# Patient Record
Sex: MALE | Race: WHITE | NOT HISPANIC OR LATINO | URBAN - METROPOLITAN AREA
[De-identification: names, ages, dates, MRNs, and addresses within clinical notes are randomized per-mention and may not be internally consistent; named-entity substitution may affect disease eponyms.]

---

## 2019-10-07 ENCOUNTER — EMERGENCY (EMERGENCY)
Facility: HOSPITAL | Age: 44
LOS: 0 days | Discharge: HOME | End: 2019-10-07
Attending: EMERGENCY MEDICINE | Admitting: EMERGENCY MEDICINE
Payer: MEDICAID

## 2019-10-07 VITALS
WEIGHT: 179.9 LBS | SYSTOLIC BLOOD PRESSURE: 163 MMHG | OXYGEN SATURATION: 99 % | TEMPERATURE: 97 F | HEART RATE: 71 BPM | DIASTOLIC BLOOD PRESSURE: 94 MMHG | RESPIRATION RATE: 18 BRPM

## 2019-10-07 DIAGNOSIS — R05 COUGH: ICD-10-CM

## 2019-10-07 DIAGNOSIS — J32.9 CHRONIC SINUSITIS, UNSPECIFIED: ICD-10-CM

## 2019-10-07 DIAGNOSIS — R07.89 OTHER CHEST PAIN: ICD-10-CM

## 2019-10-07 DIAGNOSIS — F17.210 NICOTINE DEPENDENCE, CIGARETTES, UNCOMPLICATED: ICD-10-CM

## 2019-10-07 DIAGNOSIS — R51 HEADACHE: ICD-10-CM

## 2019-10-07 LAB
ALBUMIN SERPL ELPH-MCNC: 4.3 G/DL — SIGNIFICANT CHANGE UP (ref 3.5–5.2)
ALP SERPL-CCNC: 75 U/L — SIGNIFICANT CHANGE UP (ref 30–115)
ALT FLD-CCNC: 21 U/L — SIGNIFICANT CHANGE UP (ref 0–41)
ANION GAP SERPL CALC-SCNC: 15 MMOL/L — HIGH (ref 7–14)
AST SERPL-CCNC: 14 U/L — SIGNIFICANT CHANGE UP (ref 0–41)
BILIRUB SERPL-MCNC: 0.5 MG/DL — SIGNIFICANT CHANGE UP (ref 0.2–1.2)
BUN SERPL-MCNC: 11 MG/DL — SIGNIFICANT CHANGE UP (ref 10–20)
CALCIUM SERPL-MCNC: 9.2 MG/DL — SIGNIFICANT CHANGE UP (ref 8.5–10.1)
CHLORIDE SERPL-SCNC: 101 MMOL/L — SIGNIFICANT CHANGE UP (ref 98–110)
CO2 SERPL-SCNC: 24 MMOL/L — SIGNIFICANT CHANGE UP (ref 17–32)
CREAT SERPL-MCNC: 0.8 MG/DL — SIGNIFICANT CHANGE UP (ref 0.7–1.5)
GLUCOSE SERPL-MCNC: 110 MG/DL — HIGH (ref 70–99)
HCT VFR BLD CALC: 43.2 % — SIGNIFICANT CHANGE UP (ref 42–52)
HGB BLD-MCNC: 14.6 G/DL — SIGNIFICANT CHANGE UP (ref 14–18)
MCHC RBC-ENTMCNC: 31.1 PG — HIGH (ref 27–31)
MCHC RBC-ENTMCNC: 33.8 G/DL — SIGNIFICANT CHANGE UP (ref 32–37)
MCV RBC AUTO: 91.9 FL — SIGNIFICANT CHANGE UP (ref 80–94)
NRBC # BLD: 0 /100 WBCS — SIGNIFICANT CHANGE UP (ref 0–0)
PLATELET # BLD AUTO: 197 K/UL — SIGNIFICANT CHANGE UP (ref 130–400)
POTASSIUM SERPL-MCNC: 3.8 MMOL/L — SIGNIFICANT CHANGE UP (ref 3.5–5)
POTASSIUM SERPL-SCNC: 3.8 MMOL/L — SIGNIFICANT CHANGE UP (ref 3.5–5)
PROT SERPL-MCNC: 7.2 G/DL — SIGNIFICANT CHANGE UP (ref 6–8)
RBC # BLD: 4.7 M/UL — SIGNIFICANT CHANGE UP (ref 4.7–6.1)
RBC # FLD: 11.5 % — SIGNIFICANT CHANGE UP (ref 11.5–14.5)
SODIUM SERPL-SCNC: 140 MMOL/L — SIGNIFICANT CHANGE UP (ref 135–146)
TROPONIN T SERPL-MCNC: <0.01 NG/ML — SIGNIFICANT CHANGE UP
WBC # BLD: 12.04 K/UL — HIGH (ref 4.8–10.8)
WBC # FLD AUTO: 12.04 K/UL — HIGH (ref 4.8–10.8)

## 2019-10-07 PROCEDURE — 99285 EMERGENCY DEPT VISIT HI MDM: CPT

## 2019-10-07 PROCEDURE — 71046 X-RAY EXAM CHEST 2 VIEWS: CPT | Mod: 26

## 2019-10-07 PROCEDURE — 93010 ELECTROCARDIOGRAM REPORT: CPT

## 2019-10-07 RX ORDER — ACETAMINOPHEN 500 MG
975 TABLET ORAL ONCE
Refills: 0 | Status: COMPLETED | OUTPATIENT
Start: 2019-10-07 | End: 2019-10-07

## 2019-10-07 RX ADMIN — Medication 975 MILLIGRAM(S): at 12:18

## 2019-10-07 NOTE — ED PROVIDER NOTE - NSFOLLOWUPINSTRUCTIONS_ED_ALL_ED_FT
Sinusitis, Adult    Sinusitis is soreness and inflammation of your sinuses. Sinuses are hollow spaces in the bones around your face. Your sinuses are located:     Around your eyes.  In the middle of your forehead.  Behind your nose.  In your cheekbones.    Your sinuses and nasal passages are lined with a stringy fluid (mucus). Mucus normally drains out of your sinuses. When your nasal tissues become inflamed or swollen, the mucus can become trapped or blocked so air cannot flow through your sinuses. This allows bacteria, viruses, and funguses to grow, which leads to infection.    Sinusitis can develop quickly and last for 7?10 days (acute) or for more than 12 weeks (chronic). Sinusitis often develops after a cold.    CAUSES  This condition is caused by anything that creates swelling in the sinuses or stops mucus from draining, including:    Allergies.  Asthma.  Bacterial or viral infection.  Abnormally shaped bones between the nasal passages.  Nasal growths that contain mucus (nasal polyps).  Narrow sinus openings.  Pollutants, such as chemicals or irritants in the air.  A foreign object stuck in the nose.  A fungal infection. This is rare.     RISK FACTORS  The following factors may make you more likely to develop this condition:    Having allergies or asthma.  Having had a recent cold or respiratory tract infection.  Having structural deformities or blockages in your nose or sinuses.  Having a weak immune system.  Doing a lot of swimming or diving.  Overusing nasal sprays.  Smoking.    SYMPTOMS  The main symptoms of this condition are pain and a feeling of pressure around the affected sinuses. Other symptoms include:    Upper toothache.  Earache.  Headache.  Bad breath.  Decreased sense of smell and taste.  A cough that may get worse at night.  Fatigue.  Fever.  Thick drainage from your nose. The drainage is often green and it may contain pus (purulent).  Stuffy nose or congestion.  Postnasal drip. This is when extra mucus collects in the throat or back of the nose.  Swelling and warmth over the affected sinuses.  Sore throat.  Sensitivity to light.    DIAGNOSIS  This condition is diagnosed based on symptoms, a medical history, and a physical exam. To find out if your condition is acute or chronic, your health care provider may:    Look in your nose for signs of nasal polyps.  Tap over the affected sinus to check for signs of infection.  View the inside of your sinuses using an imaging device that has a light attached (endoscope).    If your health care provider suspects that you have chronic sinusitis, you may also:    Be tested for allergies.  Have a sample of mucus taken from your nose (nasal culture) and checked for bacteria.  Have a mucus sample examined to see if your sinusitis is related to an allergy.    If your sinusitis does not respond to treatment and it lasts longer than 8 weeks, you may have an MRI or CT scan to check your sinuses. These scans also help to determine how severe your infection is.    In rare cases, a bone biopsy may be done to rule out more serious types of fungal sinus disease.    TREATMENT  Treatment for sinusitis depends on the cause and whether your condition is chronic or acute. If a virus is causing your sinusitis, your symptoms will go away on their own within 10 days. You may be given medicines to relieve your symptoms, including:    Topical nasal decongestants. They shrink swollen nasal passages and let mucus drain from your sinuses.  Antihistamines. These drugs block inflammation that is triggered by allergies. This can help to ease swelling in your nose and sinuses.  Topical nasal corticosteroids. These are nasal sprays that ease inflammation and swelling in your nose and sinuses.  Nasal saline washes. These rinses can help to get rid of thick mucus in your nose.    If your condition is caused by bacteria, you will be given an antibiotic medicine. If your condition is caused by a fungus, you will be given an antifungal medicine.    Surgery may be needed to correct underlying conditions, such as narrow nasal passages. Surgery may also be needed to remove polyps.    HOME CARE INSTRUCTIONS  Medicines    Take, use, or apply over-the-counter and prescription medicines only as told by your health care provider. These may include nasal sprays.  If you were prescribed an antibiotic medicine, take it as told by your health care provider. Do not stop taking the antibiotic even if you start to feel better.    Hydrate and Humidify    Drink enough water to keep your urine clear or pale yellow. Staying hydrated will help to thin your mucus.  Use a cool mist humidifier to keep the humidity level in your home above 50%.  Inhale steam for 10–15 minutes, 3–4 times a day or as told by your health care provider. You can do this in the bathroom while a hot shower is running.  Limit your exposure to cool or dry air.    Rest    Rest as much as possible.  Sleep with your head raised (elevated).  Make sure to get enough sleep each night.    General Instructions    Apply a warm, moist washcloth to your face 3–4 times a day or as told by your health care provider. This will help with discomfort.  Wash your hands often with soap and water to reduce your exposure to viruses and other germs. If soap and water are not available, use hand .  Do not smoke. Avoid being around people who are smoking (secondhand smoke).  Keep all follow-up visits as told by your health care provider. This is important.    SEEK MEDICAL CARE IF:  You have a fever.  Your symptoms get worse.  Your symptoms do not improve within 10 days.    SEEK IMMEDIATE MEDICAL CARE IF:  You have a severe headache.  You have persistent vomiting.  You have pain or swelling around your face or eyes.  You have vision problems.  You develop confusion.  Your neck is stiff.  You have trouble breathing.    ADDITIONAL NOTES AND INSTRUCTIONS    Please follow up with your Primary MD in 24-48 hr.  Seek immediate medical care for any new/worsening signs or symptoms.     Chest Pain    Chest pain can be caused by many different conditions which may or may not be dangerous. Causes include heartburn, lung infections, heart attack, blood clot in lungs, skin infections, strain or damage to muscle, cartilage, or bones, etc. In addition to a history and physical examination, an electrocardiogram (ECG) or other lab tests may have been performed to determine the cause of your chest pain. Follow up with your primary care provider or with a cardiologist as instructed.     SEEK IMMEDIATE MEDICAL CARE IF YOU HAVE ANY OF THE FOLLOWING SYMPTOMS: worsening chest pain, coughing up blood, unexplained back/neck/jaw pain, severe abdominal pain, dizziness or lightheadedness, fainting, shortness of breath, sweaty or clammy skin, vomiting, or racing heart beat. These symptoms may represent a serious problem that is an emergency. Do not wait to see if the symptoms will go away. Get medical help right away. Call 911 and do not drive yourself to the hospital.

## 2019-10-07 NOTE — ED PROVIDER NOTE - NSFOLLOWUPCLINICS_GEN_ALL_ED_FT
[facility]  Medicine  [address], [place]   Phone: [phone]  Fax:   Follow Up Time: 1-3 Days

## 2019-10-07 NOTE — ED PROVIDER NOTE - PROGRESS NOTE DETAILS
Patient c/o face pain, nasal congestion and cough since 6am today. Patient also c/o left chest pains x2 hours today. Admits to cocaine use all week, last use yesterday. Report given to Dr. Downey/Dr. Alvarado in the main

## 2019-10-07 NOTE — ED PROVIDER NOTE - ATTENDING CONTRIBUTION TO CARE
45 y/o male denies sig PMH / PSH, smokes cigarettes, + cocaine use, now presents with nasal congestion, facial pain / pressure, cough and generalized malaise x this AM. Sx are constant, pain intermittently radiates to his neck and chest, denies modifying factors, denies exertional chest pain, fever, earache, sore throat, nausea, vomiting, diaphoresis, hemoptysis, orthopnea, PND, LE pain or swelling, recent immobilization or travel or other associated complaints at present.    No old chart available for review.  I have reviewed and agree with the nursing note, except as documented in my note.    VSS, awake, alert, non-toxic appearing, lying comfortably in stretcher, in NAD, the pharynx is non-erythematous and tonsils appear normal, no exudates, there is no peritonsillar swelling, the submandibular space is neither swollen nor painful, no airway compromise, able to swallow and there is no drooling, voice is normal, mouth, lips, gingiva are moist and without trauma, uvula is midline, no stridor, no anterior or posterior cervical lymphadenopathy, + facial ttp, no unilateral nasal discharge, no skin rash or lesions, chest CTAB, no respiratory distress or retractions, no w/r/r, +S1/S2, RRR, no m/r/g, abdomen soft, NT, ND, +BS, no organomegaly appreciated, AO x 3, clear speech and steady gait.    IMP: sinusitis, hx less likely consistent with concurrent ACS, HEART score 1 for RF of smoking with normal EKG and labs. Results d/w pt, rec supportive care measures, outpt f/u w/o fail. The patient was given detailed return precautions and advised to return to the emergency department in 2-3 days if not improving or sooner if any new symptoms developed, symptoms worsened or for any concerns. The patient was offered the opportunity to ask questions and verbalized that they understand the diagnosis and discharge instructions.

## 2019-10-07 NOTE — ED PROVIDER NOTE - OBJECTIVE STATEMENT
44y M w/ PMH of cocaine abuse presents with facial pressure and R. eye pressure. States woke up with symptoms this morning. Describes it as a pulsating pressure in the B/L perinasal region and behind R. eye that has been constant. Has associated nasal congestion. Had intermittent chest pain from the fullness that has now resolved. States had been using cocaine the last 6 days as well. Denies headache, fever, chills, SOB, abd pain, n/v/d, or numbness/tingling. 44y M w/ PMH of cocaine abuse presents with facial pressure and R. eye pressure. States woke up with symptoms this morning. Describes it as a pulsating pressure in the B/L perinasal region and behind R. eye that has been constant. Has associated nasal congestion. Had intermittent chest pain from the fullness that has now resolved. States had been using cocaine the last 6 days as well. Had echo 3 months ago that was normal. Denies headache, fever, chills, SOB, abd pain, n/v/d, or numbness/tingling.

## 2019-10-07 NOTE — ED PROVIDER NOTE - PHYSICAL EXAMINATION
CONSTITUTIONAL: In mild discomfort.  SKIN: warm, dry  HEAD: Normocephalic; atraumatic.  EYES: PERRL, EOMI, no conjunctival erythema  ENT: No nasal discharge; airway clear. Tenderness to palpation of the B/L ethmoid sinus regions.  NECK: Supple; non tender.  CARD: S1, S2 normal; no murmurs, gallops, or rubs. Regular rate and rhythm.   RESP: No wheezes, rales or rhonchi.  ABD: soft ntnd  EXT: Normal ROM.  No clubbing, cyanosis or edema.   LYMPH: No acute cervical adenopathy.  NEURO: Alert, oriented, grossly unremarkable  PSYCH: Cooperative, appropriate.

## 2019-10-07 NOTE — ED PROVIDER NOTE - PATIENT PORTAL LINK FT
You can access the FollowMyHealth Patient Portal offered by [facility] by registering at the following website: [email or web address]. By joining [facility]’s FollowMyHealth portal, you will also be able to view your health information using other applications (apps) compatible with our system.

## 2019-10-07 NOTE — ED PROVIDER NOTE - CARE PROVIDER_API CALL
Pete Miner (MD)  Cardiovascular Disease; Internal Medicine; Interventional Cardiology  [address] [place] [address]  Phone: [phone]  Fax: [phone]  Follow Up Time: 1-3 Days

## 2019-10-07 NOTE — ED PROVIDER NOTE - NS ED ROS FT
Eyes:  No visual changes,  discharge. + eye pressure  ENMT:  No hearing changes, pain, no sore throat or runny nose, no difficulty swallowing + facial fullness  Cardiac:  No SOB or edema. No chest pain with exertion. + chest pain  Respiratory:  No cough or respiratory distress. No hemoptysis. No history of asthma or RAD.  GI:  No nausea, vomiting, diarrhea or abdominal pain.  :  No dysuria, frequency or burning.  MS:  No myalgia, muscle weakness, joint pain or back pain.  Neuro:  No headache or weakness.  No LOC.  Skin:  No skin rash.   Endocrine: No history of thyroid disease or diabetes.

## 2020-01-30 NOTE — ED PROVIDER NOTE - NS ED ATTENDING STATEMENT MOD
I have personally seen and examined this patient.  I have fully participated in the care of this patient. I have reviewed all pertinent clinical information, including history, physical exam, plan and the Resident’s note and agree except as noted.
Alert and oriented to person, place and time